# Patient Record
Sex: FEMALE | Race: WHITE | NOT HISPANIC OR LATINO | Employment: FULL TIME | URBAN - METROPOLITAN AREA
[De-identification: names, ages, dates, MRNs, and addresses within clinical notes are randomized per-mention and may not be internally consistent; named-entity substitution may affect disease eponyms.]

---

## 2020-05-18 ENCOUNTER — OFFICE VISIT (OUTPATIENT)
Dept: FAMILY MEDICINE CLINIC | Facility: CLINIC | Age: 35
End: 2020-05-18

## 2020-05-18 VITALS
HEIGHT: 69 IN | RESPIRATION RATE: 12 BRPM | BODY MASS INDEX: 19.73 KG/M2 | SYSTOLIC BLOOD PRESSURE: 120 MMHG | TEMPERATURE: 98.2 F | WEIGHT: 133.2 LBS | HEART RATE: 59 BPM | OXYGEN SATURATION: 99 % | DIASTOLIC BLOOD PRESSURE: 60 MMHG

## 2020-05-18 DIAGNOSIS — J34.2 DEVIATED SEPTUM: ICD-10-CM

## 2020-05-18 DIAGNOSIS — J30.2 SEASONAL ALLERGIES: Primary | ICD-10-CM

## 2020-05-18 PROCEDURE — 99202 OFFICE O/P NEW SF 15 MIN: CPT | Performed by: NURSE PRACTITIONER

## 2020-05-18 RX ORDER — CETIRIZINE HYDROCHLORIDE 10 MG/1
10 TABLET ORAL DAILY
Qty: 30 TABLET | Refills: 0
Start: 2020-05-18

## 2020-05-18 RX ORDER — MULTIVITAMIN
1 TABLET ORAL DAILY
COMMUNITY

## 2020-05-18 RX ORDER — FLUTICASONE PROPIONATE 50 MCG
2 SPRAY, SUSPENSION (ML) NASAL DAILY
Qty: 1 BOTTLE | Refills: 0
Start: 2020-05-18

## 2020-05-18 NOTE — PATIENT INSTRUCTIONS
Return if symptoms worsen or fail to improve.  Call with any questions or concerns.  May use Zyrtec over the counter as directed.  May use Flonase over the counter as directed.     Allergic Rhinitis, Adult  Allergic rhinitis is an allergic reaction that affects the mucous membrane inside the nose. It causes sneezing, a runny or stuffy nose, and the feeling of mucus going down the back of the throat (postnasal drip). Allergic rhinitis can be mild to severe.  There are two types of allergic rhinitis:  · Seasonal. This type is also called hay fever. It happens only during certain seasons.  · Perennial. This type can happen at any time of the year.  What are the causes?  This condition happens when the body's defense system (immune system) responds to certain harmless substances called allergens as though they were germs.   Seasonal allergic rhinitis is triggered by pollen, which can come from grasses, trees, and weeds. Perennial allergic rhinitis may be caused by:  · House dust mites.  · Pet dander.  · Mold spores.  What are the signs or symptoms?  Symptoms of this condition include:  · Sneezing.  · Runny or stuffy nose (nasal congestion).  · Postnasal drip.  · Itchy nose.  · Tearing of the eyes.  · Trouble sleeping.  · Daytime sleepiness.  How is this diagnosed?  This condition may be diagnosed based on:  · Your medical history.  · A physical exam.  · Tests to check for related conditions, such as:  ? Asthma.  ? Pink eye.  ? Ear infection.  ? Upper respiratory infection.  · Tests to find out which allergens trigger your symptoms. These may include skin or blood tests.  How is this treated?  There is no cure for this condition, but treatment can help control symptoms. Treatment may include:  · Taking medicines that block allergy symptoms, such as antihistamines. Medicine may be given as a shot, nasal spray, or pill.  · Avoiding the allergen.  · Desensitization. This treatment involves getting ongoing shots until your  body becomes less sensitive to the allergen. This treatment may be done if other treatments do not help.  · If taking medicine and avoiding the allergen does not work, new, stronger medicines may be prescribed.  Follow these instructions at home:  · Find out what you are allergic to. Common allergens include smoke, dust, and pollen.  · Avoid the things you are allergic to. These are some things you can do to help avoid allergens:  ? Replace carpet with wood, tile, or vinyl suzy. Carpet can trap dander and dust.  ? Do not smoke. Do not allow smoking in your home.  ? Change your heating and air conditioning filter at least once a month.  ? During allergy season:  § Keep windows closed as much as possible.  § Plan outdoor activities when pollen counts are lowest. This is usually during the evening hours.  § When coming indoors, change clothing and shower before sitting on furniture or bedding.  · Take over-the-counter and prescription medicines only as told by your health care provider.  · Keep all follow-up visits as told by your health care provider. This is important.  Contact a health care provider if:  · You have a fever.  · You develop a persistent cough.  · You make whistling sounds when you breathe (you wheeze).  · Your symptoms interfere with your normal daily activities.  Get help right away if:  · You have shortness of breath.  Summary  · This condition can be managed by taking medicines as directed and avoiding allergens.  · Contact your health care provider if you develop a persistent cough or fever.  · During allergy season, keep windows closed as much as possible.  This information is not intended to replace advice given to you by your health care provider. Make sure you discuss any questions you have with your health care provider.  Document Released: 09/12/2002 Document Revised: 01/25/2018 Document Reviewed: 01/25/2018  [employer] Interactive Patient Education © 2020 [employer]

## 2020-05-18 NOTE — PROGRESS NOTES
Subjective   Carole Portillo is a 35 y.o. female. She is new to the practice and new to me.     History of Present Illness   Patient here for deviated septum. She reports that she becomes short of air  while wearing mask at work. She is also here to establish care.  Pt has been working from home due to the difficulty she is having while wearing a mask. She states that she saw an ENT several years ago, but has not been back. Pt is not currently taking any medications for her issue.     She also presents with complaint of seasonal allergies.  These are ongoing she reports nasal congestion and runny nose and watery eyes.  Patient reports that when her nose is congestion she has difficulty wearing a mask and become short of air.  Patient denies taking any medications over-the-counter for her allergies.    The following portions of the patient's history were reviewed and updated as appropriate: allergies, current medications, past family history, past medical history, past social history, past surgical history and problem list.    Review of Systems   Constitutional: Negative for appetite change, chills, fatigue and fever.   HENT: Positive for congestion and postnasal drip. Negative for ear pain, rhinorrhea, sinus pressure, sneezing and sore throat.    Eyes: Negative for redness and itching.   Respiratory: Positive for shortness of breath (while wearing mask). Negative for cough, chest tightness and wheezing.    Cardiovascular: Negative for chest pain, palpitations and leg swelling.   Musculoskeletal: Negative for myalgias.   Allergic/Immunologic: Negative.    Neurological: Negative for dizziness, weakness and headache.       Objective   Physical Exam   Constitutional: She is oriented to person, place, and time. She appears well-developed and well-nourished.   HENT:   Head: Normocephalic and atraumatic.   Right Ear: External ear and ear canal normal. A middle ear effusion is present.   Left Ear: External ear and ear canal  normal. A middle ear effusion is present.   Nose: Rhinorrhea, septal deviation and congestion present. Right sinus exhibits no maxillary sinus tenderness and no frontal sinus tenderness. Left sinus exhibits no maxillary sinus tenderness and no frontal sinus tenderness.   Mouth/Throat: Uvula is midline, oropharynx is clear and moist and mucous membranes are normal. No oropharyngeal exudate. Tonsils are 1+ on the right. Tonsils are 1+ on the left.   Eyes: Pupils are equal, round, and reactive to light. Conjunctivae and EOM are normal. Right eye exhibits no discharge. Left eye exhibits no discharge.   Neck: Normal range of motion. Neck supple. No thyromegaly present.   Cardiovascular: Normal rate, regular rhythm, normal heart sounds and intact distal pulses.   No murmur heard.  Pulmonary/Chest: Effort normal and breath sounds normal. No tachypnea. No respiratory distress. She has no decreased breath sounds. She has no wheezes. She has no rales.   Lymphadenopathy:     She has no cervical adenopathy.   Neurological: She is alert and oriented to person, place, and time.   Skin: Skin is warm and dry.   Psychiatric: She has a normal mood and affect. Her behavior is normal. Judgment and thought content normal.   Nursing note and vitals reviewed.      Vitals:    05/18/20 1335   BP: 120/60   Pulse: 59   Resp: 12   Temp: 98.2 °F (36.8 °C)   SpO2: 99%     Body mass index is 19.67 kg/m².    Procedures    Assessment/Plan   Problems Addressed this Visit     None      Visit Diagnoses     Seasonal allergies    -  Primary    Relevant Medications    fluticasone (Flonase) 50 MCG/ACT nasal spray    cetirizine (zyrTEC) 10 MG tablet    Other Relevant Orders    Ambulatory Referral to ENT (Otolaryngology)    Deviated septum        Relevant Orders    Ambulatory Referral to ENT (Otolaryngology)        Return if symptoms worsen or fail to improve.         Answers for HPI/ROS submitted by the patient on 5/16/2020   What is the primary reason for  your visit?: Other  Please describe your symptoms.: My job is requiring everyone to wear a mask, and i cannot get enough air when wearing a mask. I have a deviated septum and only one nostril is actually good fro breathing and with a mask i feel like i am suffocating. They are allowing me to work form home for now but i need to have proof for accomodations so i need to get an assessment.  Have you had these symptoms before?: Yes  How long have you been having these symptoms?: Greater than 2 weeks  Please list any medications you are currently taking for this condition.: multivitiamin  Please describe any probable cause for these symptoms. : Wearing a mask    Patient Instructions   Return if symptoms worsen or fail to improve.  Call with any questions or concerns.  May use Zyrtec over the counter as directed.  May use Flonase over the counter as directed.     Allergic Rhinitis, Adult  Allergic rhinitis is an allergic reaction that affects the mucous membrane inside the nose. It causes sneezing, a runny or stuffy nose, and the feeling of mucus going down the back of the throat (postnasal drip). Allergic rhinitis can be mild to severe.  There are two types of allergic rhinitis:  · Seasonal. This type is also called hay fever. It happens only during certain seasons.  · Perennial. This type can happen at any time of the year.  What are the causes?  This condition happens when the body's defense system (immune system) responds to certain harmless substances called allergens as though they were germs.   Seasonal allergic rhinitis is triggered by pollen, which can come from grasses, trees, and weeds. Perennial allergic rhinitis may be caused by:  · House dust mites.  · Pet dander.  · Mold spores.  What are the signs or symptoms?  Symptoms of this condition include:  · Sneezing.  · Runny or stuffy nose (nasal congestion).  · Postnasal drip.  · Itchy nose.  · Tearing of the eyes.  · Trouble sleeping.  · Daytime  sleepiness.  How is this diagnosed?  This condition may be diagnosed based on:  · Your medical history.  · A physical exam.  · Tests to check for related conditions, such as:  ? Asthma.  ? Pink eye.  ? Ear infection.  ? Upper respiratory infection.  · Tests to find out which allergens trigger your symptoms. These may include skin or blood tests.  How is this treated?  There is no cure for this condition, but treatment can help control symptoms. Treatment may include:  · Taking medicines that block allergy symptoms, such as antihistamines. Medicine may be given as a shot, nasal spray, or pill.  · Avoiding the allergen.  · Desensitization. This treatment involves getting ongoing shots until your body becomes less sensitive to the allergen. This treatment may be done if other treatments do not help.  · If taking medicine and avoiding the allergen does not work, new, stronger medicines may be prescribed.  Follow these instructions at home:  · Find out what you are allergic to. Common allergens include smoke, dust, and pollen.  · Avoid the things you are allergic to. These are some things you can do to help avoid allergens:  ? Replace carpet with wood, tile, or vinyl suzy. Carpet can trap dander and dust.  ? Do not smoke. Do not allow smoking in your home.  ? Change your heating and air conditioning filter at least once a month.  ? During allergy season:  § Keep windows closed as much as possible.  § Plan outdoor activities when pollen counts are lowest. This is usually during the evening hours.  § When coming indoors, change clothing and shower before sitting on furniture or bedding.  · Take over-the-counter and prescription medicines only as told by your health care provider.  · Keep all follow-up visits as told by your health care provider. This is important.  Contact a health care provider if:  · You have a fever.  · You develop a persistent cough.  · You make whistling sounds when you breathe (you  wheeze).  · Your symptoms interfere with your normal daily activities.  Get help right away if:  · You have shortness of breath.  Summary  · This condition can be managed by taking medicines as directed and avoiding allergens.  · Contact your health care provider if you develop a persistent cough or fever.  · During allergy season, keep windows closed as much as possible.  This information is not intended to replace advice given to you by your health care provider. Make sure you discuss any questions you have with your health care provider.  Document Released: 09/12/2002 Document Revised: 01/25/2018 Document Reviewed: 01/25/2018  [employer] Interactive Patient Education © 2020 [employer]

## 2020-08-07 ENCOUNTER — OFFICE VISIT (OUTPATIENT)
Dept: FAMILY MEDICINE CLINIC | Facility: CLINIC | Age: 35
End: 2020-08-07

## 2020-08-07 VITALS
HEIGHT: 69 IN | BODY MASS INDEX: 18.81 KG/M2 | DIASTOLIC BLOOD PRESSURE: 88 MMHG | HEART RATE: 72 BPM | SYSTOLIC BLOOD PRESSURE: 120 MMHG | WEIGHT: 127 LBS

## 2020-08-07 DIAGNOSIS — Z00.00 ANNUAL PHYSICAL EXAM: Primary | ICD-10-CM

## 2020-08-07 DIAGNOSIS — Z13.220 SCREENING FOR HYPERLIPIDEMIA: ICD-10-CM

## 2020-08-07 DIAGNOSIS — Z12.4 SCREENING FOR CERVICAL CANCER: ICD-10-CM

## 2020-08-07 DIAGNOSIS — Z01.419 WOMEN'S ANNUAL ROUTINE GYNECOLOGICAL EXAMINATION: ICD-10-CM

## 2020-08-07 DIAGNOSIS — Z11.59 NEED FOR HEPATITIS C SCREENING TEST: ICD-10-CM

## 2020-08-07 PROCEDURE — 99395 PREV VISIT EST AGE 18-39: CPT | Performed by: NURSE PRACTITIONER

## 2020-08-07 NOTE — PROGRESS NOTES
Preventive Exam    History of Present Illness: Carole Portillo is a 35 y.o. here for check up and review of routine health maintenance. she states she is doing well and has no concerns.    Past medical history, surgical history and family history have been reviewed.     Review of Systems   Constitutional: Negative.  Negative for appetite change, chills, fatigue, fever, unexpected weight gain and unexpected weight loss.   HENT: Positive for congestion. Negative for dental problem, postnasal drip, rhinorrhea, sinus pressure, sneezing and sore throat.         Deviated septum. Dental exam is due.    Eyes: Negative.  Negative for blurred vision, double vision and visual disturbance.        Wears glasses.    Respiratory: Negative.  Negative for cough, chest tightness, shortness of breath and wheezing.    Cardiovascular: Negative for chest pain, palpitations and leg swelling.   Gastrointestinal: Negative for abdominal pain, constipation, diarrhea, nausea and vomiting.   Endocrine: Positive for cold intolerance. Negative for heat intolerance.   Genitourinary: Negative.  Negative for breast discharge, breast lump, breast pain, dysuria, frequency, pelvic pain, pelvic pressure, vaginal bleeding and vaginal discharge.   Musculoskeletal: Negative.  Negative for arthralgias, back pain, joint swelling and myalgias.   Skin: Positive for rash (right side of neck).   Allergic/Immunologic: Positive for environmental allergies (seasonal ). Negative for food allergies.   Neurological: Negative for dizziness, weakness, numbness and headache.   Hematological: Bruises/bleeds easily.   Psychiatric/Behavioral: Negative.  Negative for sleep disturbance and depressed mood. The patient is not nervous/anxious.        PHYSICAL EXAM    Vitals:    08/07/20 1431   BP: 120/88   Pulse: 72         Physical Exam   Constitutional: She is oriented to person, place, and time. She appears well-developed and well-nourished.   HENT:   Head: Normocephalic and  atraumatic.   Right Ear: Tympanic membrane, external ear and ear canal normal.   Left Ear: Tympanic membrane, external ear and ear canal normal.   Nose: Septal deviation present.   Mouth/Throat: Uvula is midline, oropharynx is clear and moist and mucous membranes are normal. Tonsils are 1+ on the right. Tonsils are 1+ on the left.   Eyes: Pupils are equal, round, and reactive to light. Conjunctivae and EOM are normal.   Neck: Normal range of motion. Neck supple. No thyromegaly present.   Cardiovascular: Normal rate, regular rhythm, normal heart sounds and intact distal pulses.   No murmur heard.  Pulses:       Dorsalis pedis pulses are 2+ on the right side, and 2+ on the left side.        Posterior tibial pulses are 1+ on the right side, and 1+ on the left side.   Pulmonary/Chest: Effort normal and breath sounds normal. Right breast exhibits no inverted nipple, no mass, no nipple discharge, no skin change and no tenderness. Left breast exhibits no inverted nipple, no mass, no nipple discharge, no skin change and no tenderness. No breast swelling, tenderness, discharge or bleeding. Breasts are symmetrical.       Abdominal: Soft. Bowel sounds are normal. She exhibits no distension. There is no tenderness.   Genitourinary: Vagina normal, uterus normal and cervix normal. Pelvic exam was performed with patient supine. There is no rash, tenderness, lesion, injury or Bartholin's cyst on the right labia. There is no rash, tenderness, lesion, injury or Bartholin's cyst on the left labia. Right adnexum displays no mass, no tenderness and no fullness. Right adnexum is present.Left adnexum displays no mass, no tenderness and no fullness. Left adnexum is present.  Genitourinary Comments: Rectal exam deferred.    Musculoskeletal: Normal range of motion. She exhibits no edema or deformity.   Lymphadenopathy:        Head (right side): No submental, no submandibular, no tonsillar, no preauricular, no posterior auricular and no  occipital adenopathy present.        Head (left side): No submental, no submandibular, no tonsillar, no preauricular, no posterior auricular and no occipital adenopathy present.     She has no cervical adenopathy.        Right: No supraclavicular adenopathy present.        Left: No supraclavicular adenopathy present.   Neurological: She is alert and oriented to person, place, and time. She has normal strength. No cranial nerve deficit or sensory deficit. She displays a negative Romberg sign.   Skin: Skin is warm and dry. Rash (right side of neck) noted.   Psychiatric: She has a normal mood and affect. Her speech is normal and behavior is normal. Judgment and thought content normal. Cognition and memory are normal.   Nursing note and vitals reviewed.      Procedures    Carole was seen today for annual exam.    Diagnoses and all orders for this visit:    Annual physical exam  -     CBC (No Diff)  -     Comprehensive Metabolic Panel    Screening for cervical cancer  -     PapIG, HPV, Rfx 16 / 18    Women's annual routine gynecological examination  -     PapIG, HPV, Rfx 16 / 18    Screening for hyperlipidemia  -     Lipid Panel With / Chol / HDL Ratio    Need for hepatitis C screening test  -     Hepatitis C Antibody        Problems Addressed this Visit     None      Visit Diagnoses     Annual physical exam    -  Primary    Relevant Orders    CBC (No Diff)    Comprehensive Metabolic Panel    Screening for cervical cancer        Relevant Orders    PapIG, HPV, Rfx 16 / 18    Women's annual routine gynecological examination        Relevant Orders    PapIG, HPV, Rfx 16 / 18    Screening for hyperlipidemia        Relevant Orders    Lipid Panel With / Chol / HDL Ratio    Need for hepatitis C screening test        Relevant Orders    Hepatitis C Antibody          Routine health maintenance reviewed and discussed with Carole Portillo.    Preventative counseling regarding healthy diet and exercise.   Pt reports that he wears a  seatbelt regularly.    Return in about 1 year (around 2021) for Annual, Labs.      Answers for HPI/ROS submitted by the patient on 2020   What is the primary reason for your visit?: Other  Please describe your symptoms.: I do not have any symptoms, I just need my annual exam and pap because I haven't had either in a few years.    Patient Instructions     I will call you with your lab results.   Please call with any questions or concerns.   Return in about 1 year (around 2021) for Annual, Labs.  Annual Wellness  Personal Prevention Plan of Service     Date of Office Visit:  2020  Encounter Provider:  KIRK Remy  Place of Service:  [facility]  Patient Name: Carole Portillo  :  1985    As part of the Annual Wellness portion of your visit today, we are providing you with this personalized preventive plan of services (PPPS). This plan is based upon recommendations of the [place] Preventive Services Task Force (USPSTF) and the Advisory Committee on Immunization Practices (ACIP).    This lists the preventive care services that should be considered, and provides dates of when you are due. Items listed as completed are up-to-date and do not require any further intervention.    Health Maintenance   Topic Date Due   • HEPATITIS C SCREENING  2020   • INFLUENZA VACCINE  2020   • TDAP/TD VACCINES (2 - Tdap) 2021 (Originally 3/24/1994)   • ANNUAL PHYSICAL  2021   • PAP SMEAR  2023       Orders Placed This Encounter   Procedures   • CBC (No Diff)   • Comprehensive Metabolic Panel   • Lipid Panel With / Chol / HDL Ratio   • Hepatitis C Antibody       Return in about 1 year (around 2021) for Annual, Labs.            Have you had these symptoms before?: No  How long have you been having these symptoms?: 1-4 days  Please list any medications you are currently taking for this condition.: None

## 2020-08-07 NOTE — PATIENT INSTRUCTIONS
I will call you with your lab results.   Please call with any questions or concerns.   Return in about 1 year (around 2021) for Annual, Labs.  Annual Wellness  Personal Prevention Plan of Service     Date of Office Visit:  2020  Encounter Provider:  KIRK Remy  Place of Service:  [facility]  Patient Name: Carole Portillo  :  1985    As part of the Annual Wellness portion of your visit today, we are providing you with this personalized preventive plan of services (PPPS). This plan is based upon recommendations of the [place] Preventive Services Task Force (USPSTF) and the Advisory Committee on Immunization Practices (ACIP).    This lists the preventive care services that should be considered, and provides dates of when you are due. Items listed as completed are up-to-date and do not require any further intervention.    Health Maintenance   Topic Date Due   • HEPATITIS C SCREENING  2020   • INFLUENZA VACCINE  2020   • TDAP/TD VACCINES (2 - Tdap) 2021 (Originally 3/24/1994)   • ANNUAL PHYSICAL  2021   • PAP SMEAR  2023       Orders Placed This Encounter   Procedures   • CBC (No Diff)   • Comprehensive Metabolic Panel   • Lipid Panel With / Chol / HDL Ratio   • Hepatitis C Antibody       Return in about 1 year (around 2021) for Annual, Labs.

## 2020-08-08 LAB
ALBUMIN SERPL-MCNC: 4.4 G/DL (ref 3.8–4.8)
ALBUMIN/GLOB SERPL: 1.7 {RATIO} (ref 1.2–2.2)
ALP SERPL-CCNC: 57 IU/L (ref 39–117)
ALT SERPL-CCNC: 9 IU/L (ref 0–32)
AST SERPL-CCNC: 14 IU/L (ref 0–40)
BILIRUB SERPL-MCNC: 0.4 MG/DL (ref 0–1.2)
BUN SERPL-MCNC: 9 MG/DL (ref 6–20)
BUN/CREAT SERPL: 12 (ref 9–23)
CALCIUM SERPL-MCNC: 9.3 MG/DL (ref 8.7–10.2)
CHLORIDE SERPL-SCNC: 102 MMOL/L (ref 96–106)
CHOLEST SERPL-MCNC: 141 MG/DL (ref 100–199)
CHOLEST/HDLC SERPL: 2.6 RATIO (ref 0–4.4)
CO2 SERPL-SCNC: 28 MMOL/L (ref 20–29)
CREAT SERPL-MCNC: 0.76 MG/DL (ref 0.57–1)
ERYTHROCYTE [DISTWIDTH] IN BLOOD BY AUTOMATED COUNT: 11.8 % (ref 11.7–15.4)
GLOBULIN SER CALC-MCNC: 2.6 G/DL (ref 1.5–4.5)
GLUCOSE SERPL-MCNC: 112 MG/DL (ref 65–99)
HCT VFR BLD AUTO: 40.5 % (ref 34–46.6)
HCV AB S/CO SERPL IA: <0.1 S/CO RATIO (ref 0–0.9)
HDLC SERPL-MCNC: 55 MG/DL
HGB BLD-MCNC: 13.5 G/DL (ref 11.1–15.9)
LDLC SERPL CALC-MCNC: 77 MG/DL (ref 0–99)
MCH RBC QN AUTO: 31.4 PG (ref 26.6–33)
MCHC RBC AUTO-ENTMCNC: 33.3 G/DL (ref 31.5–35.7)
MCV RBC AUTO: 94 FL (ref 79–97)
PLATELET # BLD AUTO: 220 X10E3/UL (ref 150–450)
POTASSIUM SERPL-SCNC: 3.5 MMOL/L (ref 3.5–5.2)
PROT SERPL-MCNC: 7 G/DL (ref 6–8.5)
RBC # BLD AUTO: 4.3 X10E6/UL (ref 3.77–5.28)
SODIUM SERPL-SCNC: 139 MMOL/L (ref 134–144)
TRIGL SERPL-MCNC: 44 MG/DL (ref 0–149)
VLDLC SERPL CALC-MCNC: 9 MG/DL (ref 5–40)
WBC # BLD AUTO: 5.2 X10E3/UL (ref 3.4–10.8)

## 2020-08-11 LAB
CYTOLOGIST CVX/VAG CYTO: ABNORMAL
CYTOLOGY CVX/VAG DOC CYTO: ABNORMAL
CYTOLOGY CVX/VAG DOC THIN PREP: ABNORMAL
DX ICD CODE: ABNORMAL
DX ICD CODE: ABNORMAL
HIV 1 & 2 AB SER-IMP: ABNORMAL
HPV I/H RISK 1 DNA CVX QL PROBE+SIG AMP: POSITIVE
OTHER STN SPEC: ABNORMAL
PATHOLOGIST CVX/VAG CYTO: ABNORMAL
RECOM F/U CVX/VAG CYTO: ABNORMAL
STAT OF ADQ CVX/VAG CYTO-IMP: ABNORMAL

## 2020-08-12 DIAGNOSIS — R87.619 ABNORMAL CERVICAL PAPANICOLAOU SMEAR, UNSPECIFIED ABNORMAL PAP FINDING: Primary | ICD-10-CM

## 2021-04-16 ENCOUNTER — BULK ORDERING (OUTPATIENT)
Dept: CASE MANAGEMENT | Facility: OTHER | Age: 36
End: 2021-04-16

## 2021-04-16 DIAGNOSIS — Z23 IMMUNIZATION DUE: ICD-10-CM

## 2023-11-09 ENCOUNTER — TRANSCRIBE ORDERS (OUTPATIENT)
Dept: ADMINISTRATIVE | Facility: HOSPITAL | Age: 38
End: 2023-11-09
Payer: COMMERCIAL

## 2023-11-09 DIAGNOSIS — Z12.31 SCREENING MAMMOGRAM, ENCOUNTER FOR: Primary | ICD-10-CM

## 2023-11-30 ENCOUNTER — E-VISIT (OUTPATIENT)
Dept: FAMILY MEDICINE CLINIC | Facility: TELEHEALTH | Age: 38
End: 2023-11-30
Payer: COMMERCIAL

## 2023-11-30 NOTE — E-VISIT ESCALATED
Patient escalated   Provider KIRK Ocasio chose to escalate patient to another level of care because: Insufficient information to diagnose   Patient was sent the following message:   Based on the information you've provided us, you need to take another step to get care.   What to do now:   Urgent Care   You should be seen within the next 24 hours.   Please go to a walk-in clinic or urgent care, or make an appointment to be seen within the next 24 hours.   You won't be charged for your eVisit. If you paid with a credit card, the charge will be reversed.   Chief Complaint: Heartburn or acid reflux   Patient introduction   Patient is 38-year-old female presenting with epigastric pain, heartburn, and regurgitation for less than 2 weeks. Symptoms are intermittent. No history of GERD symptoms.   General presentation   GerdQ score: 9, 79% likelihood of GERD   Heartburn: 2 or 3 days per week.   Regurgitation: 1 day per week.   Epigastric pain: Moderate intensity 2 or 3 days per week. Epigastric pain spreads to their back and comes on suddenly and reaches fullest intensity within 10 to 20 minutes. Epigastric pain is not associated with vomiting.   Heartburn or regurgitation does not make sleeping difficult. Taking antacids for heartburn or regurgitation 2 or 3 days per week.   Exacerbating factors: empty stomach, full stomach, and large meals or overeating.   Did not take anti-inflammatory medications prior to symptom onset.   Has had hoarseness, globus sensation, and belching.   Has had dysphagia. No persistent dysphagia; worsening odynophagia; choking, gagging, or coughing when swallowing; inability to swallow anything; drooling due to dysphagia; or cutting food into small pieces or avoiding certain foods due to painful/difficult swallowing.   No hematochezia, melena, hematemesis, or persistent vomiting.   No fever.   Chest pain: Has chest pain.    No history of angina, coronary artery disease, peripheral  artery disease, myocardial infarction, stroke, or transient ischemic attack.    Pain is not sharp and stabbing, not felt to be coming from the heart, and not causing patient to sweat a lot more than usual. Pain does not radiate to shoulders, neck, arms, or jaw. No pressure/heaviness in chest.    Pain is not made worse by emotional stress, exertion/exercise, or respiration.    Pain is not reproducible with palpation.    [place] Heart Score (MHS): 1, low risk of CAD. Assigning 1 point to each of 5 criteria (female 65 years old or older or male 55 years old or older, known CAD, pain worse with exercise, pain not reproducible with palpation, and patient assumes pain is cardiac), the MHS is a validated clinical decision rule used to rule out coronary artery disease in primary care patients with chest pain.   Takes the following medication(s) for their current symptoms:    Regarding the dose of famotidine they are currently taking, patient writes: 10 mg once per day.   Pregnancy/menstrual status/breastfeeding: Not pregnant. Not breastfeeding. Regarding date of last menstrual period, patient writes: Last week.   Current medications   Not taking other medications or supplements.   Medication allergies   No relevant drug allergies.   Medication contraindication review   No relevant contraindications.   Past medical history   No history of gastrointestinal tract cancer in a first-degree relative.   No history of an EGD.   No iron-deficiency anemia.   Patient-submitted comments   Patient was asked if they had anything to add about their symptoms. Patient writes: It started a few days ago out of nowhere. It went away, and then I was stretching on my yoga mat and I felt a pain in my right rib and then the heartburn started and has gotten worse. Come and goes..   Patient did not request an excuse note.   Assessment:   Patient determined to need a level of care not appropriate to be delivered through eVisit.   Plan:   Patient  informed of need to seek in-person care   ----------   Electronically signed by KIRK Ocasio on 2023-11-30 at 10:26AM   ----------   Patient Interview Transcript:   Which of these symptoms do you have?    Pain in the middle of the upper stomach area (1)    Heartburn, or a burning feeling behind the breastbone (2)    Regurgitation, or movement of stomach acid or undigested food up into the throat or mouth   Not selected:    Nausea   Do any of these describe the pain in the middle of your upper stomach? Select all that apply.    It spreads to my back    It comes on suddenly and reaches fullest intensity within 10 to 20 minutes   Not selected:    It feels better when I sit up or bend forward    It spreads to my right shoulder    It spreads to the area between my shoulder blades    It comes along with heavy sweating    It usually happens after a fatty meal    It reaches fullest intensity within 30 to 60 minutes, and then it starts to fade    None of the above   How intense is the pain in the middle of your upper stomach? Select one.    It's uncomfortable, but I can still do regular daily tasks   Not selected:    I only notice it when I pay attention to it    It stops me from doing some regular daily tasks    It makes it hard for me to move or breathe   In the last week, how many days have you had pain in your upper stomach? Select one.    2 or 3   Not selected:    1    4 to 7   In the last week, how many days have you had heartburn? Select one.    2 or 3   Not selected:    1    4 to 7   In the last week, how many days have you had regurgitation? Regurgitation happens when stomach acid or undigested food moves up into the throat or mouth. Select one.    1   Not selected:    2 or 3    4 to 7   In the last week, how many days did you have trouble sleeping because of heartburn or regurgitation? Select one.    0   Not selected:    1    2 or 3    4 to 7   In the last week, how many days did you take antacids for  heartburn or regurgitation? Antacids include medications such as Tums, Rolaids, Maalox, and Pepto-Bismol. Antacids do not include daily acid-suppressing medications such as Prilosec (omeprazole), Prevacid (lansoprazole), Nexium (esomeprazole), Pepcid (famotidine), or Zantac (ranitidine). Select one.    2 or 3   Not selected:    0    1    4 to 7   Are your symptoms constant, or do they come and go? Select one.    Come and go   Not selected:    Constant   How long have you had this current episode of symptoms? Select one.    Less than 2 weeks   Not selected:    2 to 4 weeks    1 to 3 months    More than 3 months   Is this the first time you've had these kinds of symptoms? Select one.    Yes   Not selected:    No   Do any of these make your symptoms worse? Select all that apply.    Empty stomach    Full stomach    Large meals or overeating   Not selected:    Stress    Chocolate    Caffeine    Alcohol    Carbonated drinks, such as soda or sparkling water    Acidic foods, such as tomatoes and oranges    Spicy foods    Lying down    Eating within 2 to 3 hours of bedtime    None of the above   Is the pain in your upper stomach associated with vomiting? Select one.    No   Not selected:    Yes   Have you recently had any of these symptoms? Select all that apply.    Difficulty swallowing, or feeling as if food is stuck in your throat or chest   Not selected:    Loss of appetite    Unexplained or unintentional weight loss    Feeling full after eating only a small amount    Pain with swallowing    None of the above   Do any of these describe your pain with swallowing or difficulty swallowing? Select all that apply.    None of the above   Not selected:    It's always hard to swallow liquids and solids    My pain is getting worse    I choke, gag, or cough when I swallow    I can't swallow anything    I'm drooling because I can't swallow    I need to cut my food into small pieces or avoid certain foods   Have you recently had any  "of these symptoms? Select all that apply.    Hoarse voice    Constant feeling of a lump in your throat    Belching   Not selected:    Choking    Chronic cough    Wheezing    Bloating    None of the above   Have you recently had any of these symptoms? Select all that apply.    None of the above   Not selected:    Blood in your stool    Dark, tarry stools    Vomiting (throwing up) blood    Vomiting (throwing up) more than 3 to 4 times a day for several days   Do you have chest pain? You might also feel it as discomfort, aching, tightness, or squeezing in the chest. Select one.    Yes   Not selected:    No   Which of these are true of your chest pain?    None of these   Not selected:    It feels like it's spreading into my shoulders, neck, arms, or jaw    It feels like crushing pressure on my chest    It feels like a sharp, stabbing pain    It feels like it's coming from my heart    It makes me sweat a lot more than usual   Do any of these make your chest pain worse? Select all that apply.    None of these   Not selected:    Emotional stress    Exertion or exercise    Taking a deep breath   Gently press on your chest with the palm of your hand. Does this make your chest pain feel worse? Select one.    No   Not selected:    Yes   Have you ever had any of these conditions? Select all that apply.    None of the above   Not selected:    Angina    Blocked arteries in the heart    Blocked arteries in the legs    Heart attack    Stroke or TIA (\"mini-stroke\")   Have you recently had a fever that can't be explained by a cold or another infection? The gastrointestinal conditions treated here don't cause a fever. Select one.    No   Not selected:    Yes   Before your symptoms started, did you take anti-inflammatory medications? Anti-inflammatory medications are often called NSAIDs, or non-steroidal anti-inflammatory drugs. Examples include ibuprofen (Advil, Motrin), aspirin, naproxen (Aleve), indomethacin (Indocin), diclofenac " (Voltaren), ketorolac (Toradol), meloxicam (Mobic), and celecoxib (Celebrex). Select one.    No   Not selected:    Yes, once or twice    Yes, regularly    Yes, at high doses    _Yes, regularly and at high doses _   Do you currently have or are you currently being treated for iron-deficiency anemia? Iron-deficiency anemia means that your body doesn't have enough iron to make healthy red blood cells. Select one.    No   Not selected:    Yes   Have any of your first-degree relatives had a cancer of the gastrointestinal tract? First-degree relatives include parents, siblings, and children. Gastrointestinal cancers include esophageal cancer, stomach cancer, small intestine cancers, pancreatic cancer, and colon cancer. Select one.    No, not that I know of   Not selected:    Yes   Have you ever had an upper endoscopy (esophagogastroduodenoscopy, or EGD) study? An EGD is performed by a gastroenterologist or GI doctor. The doctor uses a thin scope with a camera to look at the lining of the upper digestive tract. Select one.    No   Not selected:    Yes   Are you pregnant? Select one.    No   Not selected:    Yes   When was your last menstrual period? If you don't currently have periods or no longer have periods, please briefly explain.    Last week   Are you breastfeeding? Select one.    No   Not selected:    Yes   What are you taking for your current symptoms? Select all that apply.    Pepcid (famotidine)   Not selected:    Antacids such as Tums, Rolaids, Maalox, or Pepto-Bismol    Zantac (ranitidine)    Tagamet (cimetidine)    Axid (nizatidine)    Prilosec (omeprazole)    Prevacid (lansoprazole)    Nexium (esomeprazole)    Protonix (pantoprazole)    Dexilant (dexlansoprazole)    Aciphex (rabeprazole)    Other (specify)    I'm not taking anything   What dose of Pepcid (famotidine) are you taking? Select one.    Other (specify): 10 mg once per day   Not selected:    10mg twice a day    20mg twice a day   Are you taking any  other medications, vitamins, or supplements? Select one.    No   Not selected:    Yes   Have you ever had an allergic or bad reaction to any medication? Select one.    Yes   Not selected:    No   Have you had an allergic or bad reaction to any of these medications? Select all that apply.    None of the above   Not selected:    Proton pump inhibitor (PPI) medications (dexlansoprazole/Dexilant, esomeprazole/Nexium, lansoprazole/Prevacid, omeprazole/Prilosec, pantoprazole/Protonix, or rabeprazole/Aciphex)    Histamine-2 (H2) blocker medications (cimetidine/Tagamet, famotidine/Pepcid, nizatidine/Axid, or ranitidine/Zantac)    Indigestion and antacid medications such as Carafate, Kaopectate, Pepto-Bismol, or Maalox   Do you need a doctor's note? A doctor's note confirms that you received care today and states when you can return to school or work. It does not contain information about your diagnosis or treatment plan. Your provider will make the final decision on whether to give you a doctor's note. Doctor's notes CANNOT be backdated. Select one.    No   Not selected:    Today only (1 day)    Today and tomorrow (2 days)    3 days   Is there anything you'd like to add about your symptoms? Please limit your comments to the symptoms asked about in this interview. If you include comments about other concerns, your provider may recommend that you be seen in person.    It started a few days ago out of nowhere. It went away, and then I was stretching on my yoga mat and I felt a pain in my right rib and then the heartburn started and has gotten worse. Come and goes.   ----------   Medical history   Medical history data does not currently exist for this patient.

## 2023-12-05 ENCOUNTER — TELEPHONE (OUTPATIENT)
Dept: FAMILY MEDICINE CLINIC | Facility: CLINIC | Age: 38
End: 2023-12-05

## 2023-12-05 NOTE — TELEPHONE ENCOUNTER
Caller: Carole Portillo    Relationship: Self    Best call back number: [phone]     What orders are you requesting (i.e. lab or imaging): ULTRASOUND OF GALLBLADDER     In what timeframe would the patient need to come in: ASAP    Where will you receive your lab/imaging services: [facility]     Additional notes: PATIENT CALLING STATING THAT SHE WENT TO THE [facility] YESTERDAY AND THEY SUGGESTED THAT SHE GET AN ULTRA SOUND OF HER GALLBLADDER DUE TO HAVING ABDOMINAL PAIN

## 2023-12-07 DIAGNOSIS — R91.1 LUNG NODULE SEEN ON IMAGING STUDY: Primary | ICD-10-CM

## 2024-02-07 ENCOUNTER — PATIENT MESSAGE (OUTPATIENT)
Dept: OTHER | Facility: HOSPITAL | Age: 39
End: 2024-02-07

## 2024-08-23 ENCOUNTER — PATIENT ROUNDING (BHMG ONLY) (OUTPATIENT)
Dept: OBSTETRICS AND GYNECOLOGY | Age: 39
End: 2024-08-23
Payer: COMMERCIAL

## 2024-08-23 ENCOUNTER — OFFICE VISIT (OUTPATIENT)
Dept: OBSTETRICS AND GYNECOLOGY | Age: 39
End: 2024-08-23
Payer: COMMERCIAL

## 2024-08-23 VITALS
BODY MASS INDEX: 20.32 KG/M2 | HEIGHT: 69 IN | SYSTOLIC BLOOD PRESSURE: 124 MMHG | DIASTOLIC BLOOD PRESSURE: 70 MMHG | WEIGHT: 137.2 LBS

## 2024-08-23 DIAGNOSIS — Z12.4 SCREENING FOR CERVICAL CANCER: ICD-10-CM

## 2024-08-23 DIAGNOSIS — Z76.89 ENCOUNTER TO ESTABLISH CARE: ICD-10-CM

## 2024-08-23 DIAGNOSIS — Z01.419 WELL WOMAN EXAM WITH ROUTINE GYNECOLOGICAL EXAM: Primary | ICD-10-CM

## 2024-08-23 DIAGNOSIS — Z12.31 SCREENING MAMMOGRAM FOR BREAST CANCER: ICD-10-CM

## 2024-08-23 RX ORDER — METRONIDAZOLE 7.5 MG/G
GEL TOPICAL
COMMUNITY
Start: 2024-04-24 | End: 2025-04-24

## 2024-08-23 RX ORDER — DIPHENOXYLATE HYDROCHLORIDE AND ATROPINE SULFATE 2.5; .025 MG/1; MG/1
1 TABLET ORAL DAILY
COMMUNITY

## 2024-08-23 NOTE — PROGRESS NOTES
[facility]   Obstetrics and Gynecology   Routine Annual Visit    2024    Patient: Carole Portillo          MR#:[id number]    History of Present Illness    Chief Complaint   Patient presents with    [facility]    Annual Exam     NGYN - AE today, Abnormal pap 2020 LSIL and HPV pos, MG - has not had yet, No problems today       39 y.o. female  who presents for annual exam.  Reports LSIL/HPV pos pap in  with a normal pap the following year but is not sure.  Denies h/o HSIL or cervical procedures.      Reports regular monthly menses but has skipped a few times.  It seems to be affected by being around a lot of women.  No contraception.  Uses condoms.  No interest in childbearing and feels very sure of this.    Studies reviewed:  PapIG, HPV, Rfx 16 / 18 (2020 00:00) LSIL/ HPV pos  Never had mammo    Obstetric History:  OB History          0    Para   0    Term   0       0    AB   0    Living   0         SAB   0    IAB   0    Ectopic   0    Molar   0    Multiple   0    Live Births   0               Menstrual History:     Patient's last menstrual period was 2024 (approximate).       Sexual History:   Sexually active with male, declines STD testing    Social History:   Works as [employer]    ________________________________________  There is no problem list on file for this patient.    Past Medical History:   Diagnosis Date    Allergic     Seasonal    Anxiety     Deviated septum      Past Surgical History:   Procedure Laterality Date    WISDOM TOOTH EXTRACTION       Social History     Tobacco Use   Smoking Status Never    Passive exposure: Current   Smokeless Tobacco Never     Family History   Problem Relation Age of Onset    No Known Problems Father     Hypertension Mother     Diabetes Mother     Cervical cancer Mother     Endometriosis Sister     Other (oth) Sister         Precancerous cells on pap    Cervical cancer Maternal  "Grandmother     Breast cancer Neg Hx     Ovarian cancer Neg Hx     Uterine cancer Neg Hx     Colon cancer Neg Hx      Prior to Admission medications    Medication Sig Start Date End Date Taking? Authorizing Provider   multivitamin tablet tablet Take 1 tablet by mouth Daily.   Yes Christina Toledo MD   metroNIDAZOLE (METROGEL) 0.75 % gel Apply twice daily to rosacea  Patient not taking: Reported on 8/23/2024 4/24/24 4/24/25  ProviderChristina MD   pantoprazole (PROTONIX) 40 MG EC tablet Take 1 tablet by mouth Daily. 12/4/23 8/23/24  Ellen Gay, APRN     ________________________________________    Current contraception: condoms  History of abnormal Pap smear: yes - see above  Family history of uterine or ovarian cancer: no  Family History of colon cancer/colon polyps: no  History of abnormal mammogram: no    The following portions of the patient's history were reviewed and updated as appropriate: allergies, current medications, past family history, past medical history, past social history, past surgical history, and problem list.    Review of Systems   All other systems reviewed and are negative.           Objective     /70   Ht 175.3 cm (69\")   Wt 62.2 kg (137 lb 3.2 oz)   LMP 08/02/2024 (Approximate)   Breastfeeding No   BMI 20.26 kg/m²    BP Readings from Last 3 Encounters:   08/23/24 124/70   12/04/23 139/84   05/02/23 142/83      Wt Readings from Last 3 Encounters:   08/23/24 62.2 kg (137 lb 3.2 oz)   12/04/23 59.9 kg (132 lb)   05/02/23 59 kg (130 lb)        BMI: Estimated body mass index is 20.26 kg/m² as calculated from the following:    Height as of this encounter: 175.3 cm (69\").    Weight as of this encounter: 62.2 kg (137 lb 3.2 oz).    Physical Exam  Vitals and nursing note reviewed.   Constitutional:       General: She is not in acute distress.     Appearance: Normal appearance.   HENT:      Head: Normocephalic and atraumatic.   Eyes:      Extraocular Movements: " Extraocular movements intact.   Cardiovascular:      Rate and Rhythm: Normal rate and regular rhythm.      Pulses: Normal pulses.      Heart sounds: No murmur heard.  Pulmonary:      Effort: Pulmonary effort is normal. No respiratory distress.      Breath sounds: Normal breath sounds.   Chest:   Breasts:     Right: Normal. No mass, nipple discharge, skin change or tenderness.      Left: Normal. No mass, nipple discharge, skin change or tenderness.   Abdominal:      General: There is no distension.      Palpations: Abdomen is soft. There is no mass.      Tenderness: There is no abdominal tenderness.   Genitourinary:     General: Normal vulva.      Labia:         Right: No rash or lesion.         Left: No rash or lesion.       Urethra: No prolapse, urethral swelling or urethral lesion.      Vagina: Normal.      Cervix: Normal.      Uterus: Normal.       Adnexa: Right adnexa normal and left adnexa normal.      Comments: Bladder: no masses or tenderness  Perineum/Anus: no masses, lesions, or skin changes  Musculoskeletal:         General: No swelling. Normal range of motion.      Cervical back: Normal range of motion.   Lymphadenopathy:      Upper Body:      Right upper body: No axillary adenopathy.      Left upper body: No axillary adenopathy.   Skin:     General: Skin is warm and dry.   Neurological:      General: No focal deficit present.      Mental Status: She is alert and oriented to person, place, and time.   Psychiatric:         Mood and Affect: Mood normal.         Behavior: Behavior normal.         As part of wellness and prevention, the following topics were discussed with the patient:  Encouraged self breast exam  Physical activity and regular exercised encouraged.   Injury prevention discussed.  Healthy weight discussed.  Nutrition discussed.  Substance abuse/misuse discussed.  Sexual behavior/safe practices discussed.   Sexual transmitted disease prevention   Contraception discussed.   Mental health  discussed.           Assessment:  Diagnoses and all orders for this visit:    1. Well woman exam with routine gynecological exam (Primary)  -     Mammo Screening Digital Tomosynthesis Bilateral With CAD; Future  -     IGP, Apt HPV,rfx 16 / 18,45    2. Encounter to establish care    3. Screening mammogram for breast cancer  -     Mammo Screening Digital Tomosynthesis Bilateral With CAD; Future    4. Screening for cervical cancer  -     IGP, Apt HPV,rfx 16 / 18,45    -Breast and pelvic exam normal  - Pap today  - Declined STD screen  - Discussed breast cancer screening.  Recommend mammogram annually.  She feels nervous, mostly due to pain, but will consider.  Ordered.  - Discussed all contraceptive options.  She feels strongly that she is not interested in hormonal contraception.  Discussed possibility of permanent sterilization with salpingectomy.  She feels very certain she does not want to be pregnant and is heavily considering this.  She will let me know if she is interested in moving forward.  We also discussed the possibility of better managing her menses.  A hysterectomy could be reasonable but we should talk about this much more thoroughly.      Plan:  Return in about 1 year (around 8/23/2025) for Annual.      Cristy Henriquez MD  8/23/2024 09:32 EDT

## 2024-08-28 LAB
CYTOLOGIST CVX/VAG CYTO: ABNORMAL
CYTOLOGY CVX/VAG DOC CYTO: ABNORMAL
CYTOLOGY CVX/VAG DOC THIN PREP: ABNORMAL
DX ICD CODE: ABNORMAL
HPV I/H RISK 4 DNA CVX QL PROBE+SIG AMP: POSITIVE
HPV16 DNA CVX QL PROBE+SIG AMP: NEGATIVE
HPV18+45 E6+E7 MRNA CVX QL NAA+PROBE: NEGATIVE
Lab: ABNORMAL
OTHER STN SPEC: ABNORMAL
STAT OF ADQ CVX/VAG CYTO-IMP: ABNORMAL

## 2024-11-24 ENCOUNTER — PATIENT MESSAGE (OUTPATIENT)
Dept: OBSTETRICS AND GYNECOLOGY | Age: 39
End: 2024-11-24
Payer: COMMERCIAL